# Patient Record
Sex: MALE | Race: BLACK OR AFRICAN AMERICAN | NOT HISPANIC OR LATINO
[De-identification: names, ages, dates, MRNs, and addresses within clinical notes are randomized per-mention and may not be internally consistent; named-entity substitution may affect disease eponyms.]

---

## 2020-08-11 PROBLEM — Z00.00 ENCOUNTER FOR PREVENTIVE HEALTH EXAMINATION: Status: ACTIVE | Noted: 2020-08-11

## 2020-08-12 ENCOUNTER — APPOINTMENT (OUTPATIENT)
Dept: GASTROENTEROLOGY | Facility: CLINIC | Age: 45
End: 2020-08-12
Payer: COMMERCIAL

## 2020-08-12 VITALS
HEIGHT: 66 IN | WEIGHT: 265 LBS | HEART RATE: 77 BPM | SYSTOLIC BLOOD PRESSURE: 128 MMHG | DIASTOLIC BLOOD PRESSURE: 70 MMHG | BODY MASS INDEX: 42.59 KG/M2

## 2020-08-12 DIAGNOSIS — Z12.11 ENCOUNTER FOR SCREENING FOR MALIGNANT NEOPLASM OF COLON: ICD-10-CM

## 2020-08-12 PROCEDURE — 99244 OFF/OP CNSLTJ NEW/EST MOD 40: CPT

## 2020-08-12 RX ORDER — METFORMIN HYDROCHLORIDE 500 MG/1
500 TABLET, COATED ORAL
Refills: 0 | Status: ACTIVE | COMMUNITY

## 2020-08-21 ENCOUNTER — RESULT REVIEW (OUTPATIENT)
Age: 45
End: 2020-08-21

## 2020-09-02 NOTE — PHYSICAL EXAM
OK to refill per protocol.  Last office visit - 02/12/20  Next scheduled appointment-none; due in August  Last Lab Result-Metformin used for PCOS. No A1c needed.      [General Appearance - Alert] : alert [General Appearance - In No Acute Distress] : in no acute distress [Sclera] : the sclera and conjunctiva were normal [Outer Ear] : the ears and nose were normal in appearance [Neck Appearance] : the appearance of the neck was normal [] : no respiratory distress [Apical Impulse] : the apical impulse was normal [Abdomen Soft] : soft [Abnormal Walk] : normal gait [Skin Color & Pigmentation] : normal skin color and pigmentation [Cranial Nerves] : cranial nerves 2-12 were intact [Oriented To Time, Place, And Person] : oriented to person, place, and time [FreeTextEntry1] : deferred to upcoming colonoscopy

## 2020-09-02 NOTE — ASSESSMENT
[FreeTextEntry1] : RLQ pain, ?hernia. Will check CT\par \par Colon cancer screening- colonoscopy recommended. \par \par Risks (including but not limited to sedation risks, infection, bleeding, perforation, possibility of missed lesions), benefits and alternatives to procedure, including not doing the procedure, were discussed with patient. Patient understood and agreed to proceed with colonoscopy. \par Colonoscopy preparation instructions reviewed with patient.\par

## 2020-09-02 NOTE — HISTORY OF PRESENT ILLNESS
[FreeTextEntry1] : 45 year M obesity, dm2, no surgical history presents for evaluation of abd pain. \par He is seen at the request of Dr. Magdalena Malhotra\par \par Abd pain x years, RLQ, \par not progressive\par constant throb, some days more intense than others, feels worse when supine, does not wake him up at night. \par better after bm\par he has not had imaging\par no history of anemia, labs with pmd last week apparently normal \par he has some baseline mild constipation\par he has bm every day. sometimes requires straining.no brbpr/melena. \par no weight loss\par \par \par No prior colonoscopy\par \par \par PMD: Dr. Magdalena Malhotra

## 2020-09-02 NOTE — ADDENDUM
[FreeTextEntry1] : Patient is  and at increased risk for colon cancer. ACS guidelines support starting colon cancer screening at age 45.

## 2020-09-07 ENCOUNTER — RESULT REVIEW (OUTPATIENT)
Age: 45
End: 2020-09-07

## 2020-09-08 ENCOUNTER — APPOINTMENT (OUTPATIENT)
Dept: GASTROENTEROLOGY | Facility: HOSPITAL | Age: 45
End: 2020-09-08

## 2021-05-11 ENCOUNTER — APPOINTMENT (OUTPATIENT)
Dept: GASTROENTEROLOGY | Facility: CLINIC | Age: 46
End: 2021-05-11

## 2021-06-09 ENCOUNTER — APPOINTMENT (OUTPATIENT)
Dept: GASTROENTEROLOGY | Facility: CLINIC | Age: 46
End: 2021-06-09

## 2021-08-03 ENCOUNTER — APPOINTMENT (OUTPATIENT)
Dept: GASTROENTEROLOGY | Facility: CLINIC | Age: 46
End: 2021-08-03
Payer: COMMERCIAL

## 2021-08-03 VITALS
BODY MASS INDEX: 43.71 KG/M2 | HEIGHT: 66 IN | HEART RATE: 73 BPM | SYSTOLIC BLOOD PRESSURE: 126 MMHG | TEMPERATURE: 97 F | DIASTOLIC BLOOD PRESSURE: 76 MMHG | OXYGEN SATURATION: 97 % | WEIGHT: 272 LBS

## 2021-08-03 DIAGNOSIS — R10.31 RIGHT LOWER QUADRANT PAIN: ICD-10-CM

## 2021-08-03 DIAGNOSIS — G89.29 RIGHT LOWER QUADRANT PAIN: ICD-10-CM

## 2021-08-03 DIAGNOSIS — R49.0 DYSPHONIA: ICD-10-CM

## 2021-08-03 DIAGNOSIS — K59.09 OTHER CONSTIPATION: ICD-10-CM

## 2021-08-03 DIAGNOSIS — Z87.19 PERSONAL HISTORY OF OTHER DISEASES OF THE DIGESTIVE SYSTEM: ICD-10-CM

## 2021-08-03 PROCEDURE — 99214 OFFICE O/P EST MOD 30 MIN: CPT

## 2021-08-03 RX ORDER — ATORVASTATIN CALCIUM 10 MG/1
10 TABLET, FILM COATED ORAL
Refills: 0 | Status: ACTIVE | COMMUNITY

## 2021-08-03 NOTE — PHYSICAL EXAM
[General Appearance - In No Acute Distress] : in no acute distress [General Appearance - Alert] : alert [Sclera] : the sclera and conjunctiva were normal [Outer Ear] : the ears and nose were normal in appearance [Neck Appearance] : the appearance of the neck was normal [] : no respiratory distress [Apical Impulse] : the apical impulse was normal [Abdomen Soft] : soft [Abnormal Walk] : normal gait [Cranial Nerves] : cranial nerves 2-12 were intact [Skin Color & Pigmentation] : normal skin color and pigmentation [Oriented To Time, Place, And Person] : oriented to person, place, and time [FreeTextEntry1] : deferred to upcoming colonoscopy

## 2021-08-03 NOTE — ASSESSMENT
[FreeTextEntry1] : h/o ? SBO\par -obtain hospital records, recent labs\par -MRE \par -will consider for VCE based on MRE and record review\par \par Constipation- \par Start daily fiber suppplement- benefiber, \par \par Hoarseness- \par ENT- he saw recently  was prescribed Prednisone and nasal spray,no improvement\par referred to Dr. Mc for TNE\par \par RLQ pain, no cause identified on ct/colonoscopy in 2020\par -MRE\par

## 2021-08-03 NOTE — HISTORY OF PRESENT ILLNESS
[FreeTextEntry1] : 46 year old M obesity, dm2, no surgical history presents for  follow up from hospitalization for small bowel obstruction. \par He is joined today by his wife. \par . \par He was seen in 08/2020 for evaluation of chronic RLQ pain and chronic constipation. . \par CT A/P 8/21/20- unremarkable\par Colonoscopy 9/8/2020- diminutive nonadenomatous polyps, hemorrhoids\par \par He was admitted 05/2021 to [facility], for "blockage in his intestine"   vs. inflammation that looked like a blockage. no NGT, no abx, he had xrays.  caused him to vomit several times.\par He does not have records available today. \par \par he continues to have chronic RLQ pain , \par feels constipated most of the time, never feels empty\par he has had hoarseness since his admission in May 2021, he saw ENT who prescribed nasal spray which has not been helpful .\par \par He saw PMD last week, he is getting evaluated for  hip pain, \par he had labs last week, he left them at home. \par his uric acid was apparently elevated. \par \par He has gained 6 lbs recently. \par He has hb/reflux- takes Nexium  very rarely based on what he is eating or eating too late. \par \par \par PMD: Dr. Magdalena Malhotra

## 2021-09-03 ENCOUNTER — RESULT REVIEW (OUTPATIENT)
Age: 46
End: 2021-09-03

## 2024-09-04 ENCOUNTER — RX ONLY (RX ONLY)
Age: 49
End: 2024-09-04

## 2024-09-04 ENCOUNTER — OFFICE (OUTPATIENT)
Dept: URBAN - METROPOLITAN AREA CLINIC 86 | Facility: CLINIC | Age: 49
Setting detail: OPHTHALMOLOGY
End: 2024-09-04
Payer: COMMERCIAL

## 2024-09-04 DIAGNOSIS — S05.01XA: ICD-10-CM

## 2024-09-04 PROCEDURE — 99203 OFFICE O/P NEW LOW 30 MIN: CPT | Performed by: OPHTHALMOLOGY

## 2024-09-04 ASSESSMENT — CONFRONTATIONAL VISUAL FIELD TEST (CVF)
OS_FINDINGS: FULL
OD_FINDINGS: FULL